# Patient Record
Sex: MALE | URBAN - METROPOLITAN AREA
[De-identification: names, ages, dates, MRNs, and addresses within clinical notes are randomized per-mention and may not be internally consistent; named-entity substitution may affect disease eponyms.]

---

## 2020-09-30 ENCOUNTER — TELEPHONE (OUTPATIENT)
Dept: GASTROENTEROLOGY | Age: 20
End: 2020-09-30

## 2020-11-05 ENCOUNTER — NURSE TRIAGE (OUTPATIENT)
Dept: INTERNAL MEDICINE | Age: 20
End: 2020-11-05

## 2020-11-06 ENCOUNTER — APPOINTMENT (OUTPATIENT)
Dept: INTERNAL MEDICINE | Age: 20
End: 2020-11-06

## 2020-11-11 ENCOUNTER — TELEPHONE (OUTPATIENT)
Dept: INTERNAL MEDICINE | Age: 20
End: 2020-11-11

## 2021-03-08 ENCOUNTER — HOSPITAL ENCOUNTER (EMERGENCY)
Facility: HOSPITAL | Age: 21
Discharge: HOME | End: 2021-03-08
Attending: EMERGENCY MEDICINE
Payer: COMMERCIAL

## 2021-03-08 ENCOUNTER — APPOINTMENT (OUTPATIENT)
Dept: GENERAL RADIOLOGY | Facility: HOSPITAL | Age: 21
End: 2021-03-08
Attending: EMERGENCY MEDICINE
Payer: COMMERCIAL

## 2021-03-08 VITALS
WEIGHT: 147 LBS | SYSTOLIC BLOOD PRESSURE: 145 MMHG | HEART RATE: 75 BPM | HEIGHT: 72 IN | RESPIRATION RATE: 16 BRPM | DIASTOLIC BLOOD PRESSURE: 86 MMHG | OXYGEN SATURATION: 100 % | BODY MASS INDEX: 19.91 KG/M2

## 2021-03-08 DIAGNOSIS — R11.15 CYCLICAL VOMITING: Primary | ICD-10-CM

## 2021-03-08 DIAGNOSIS — F12.90 CANNABINOID HYPEREMESIS SYNDROME: ICD-10-CM

## 2021-03-08 DIAGNOSIS — E86.0 DEHYDRATION: ICD-10-CM

## 2021-03-08 DIAGNOSIS — R11.2 CANNABINOID HYPEREMESIS SYNDROME: ICD-10-CM

## 2021-03-08 LAB
ALBUMIN SERPL-MCNC: 4.4 G/DL (ref 3.4–5)
ALBUMIN/GLOB SERPL: 1.1 {RATIO} (ref 1–2)
ALP LIVER SERPL-CCNC: 67 U/L
ALT SERPL-CCNC: 31 U/L
AMPHET UR QL SCN: NEGATIVE
ANION GAP SERPL CALC-SCNC: 8 MMOL/L (ref 0–18)
AST SERPL-CCNC: 15 U/L (ref 15–37)
BASOPHILS # BLD AUTO: 0.04 X10(3) UL (ref 0–0.2)
BASOPHILS NFR BLD AUTO: 0.4 %
BENZODIAZ UR QL SCN: NEGATIVE
BILIRUB SERPL-MCNC: 2.4 MG/DL (ref 0.1–2)
BILIRUB UR QL STRIP.AUTO: NEGATIVE
BUN BLD-MCNC: 17 MG/DL (ref 7–18)
BUN/CREAT SERPL: 19.1 (ref 10–20)
CALCIUM BLD-MCNC: 9.5 MG/DL (ref 8.5–10.1)
CHLORIDE SERPL-SCNC: 99 MMOL/L (ref 98–112)
CLARITY UR REFRACT.AUTO: CLEAR
CO2 SERPL-SCNC: 27 MMOL/L (ref 21–32)
COCAINE UR QL: NEGATIVE
COLOR UR AUTO: YELLOW
CREAT BLD-MCNC: 0.89 MG/DL
CREAT UR-SCNC: 254 MG/DL
DEPRECATED RDW RBC AUTO: 34.1 FL (ref 35.1–46.3)
EOSINOPHIL # BLD AUTO: 0.17 X10(3) UL (ref 0–0.7)
EOSINOPHIL NFR BLD AUTO: 1.9 %
ERYTHROCYTE [DISTWIDTH] IN BLOOD BY AUTOMATED COUNT: 11.6 % (ref 11–15)
GLOBULIN PLAS-MCNC: 3.9 G/DL (ref 2.8–4.4)
GLUCOSE BLD-MCNC: 89 MG/DL (ref 70–99)
GLUCOSE UR STRIP.AUTO-MCNC: NEGATIVE MG/DL
HCT VFR BLD AUTO: 49.6 %
HGB BLD-MCNC: 17.2 G/DL
IMM GRANULOCYTES # BLD AUTO: 0.04 X10(3) UL (ref 0–1)
IMM GRANULOCYTES NFR BLD: 0.4 %
KETONES UR STRIP.AUTO-MCNC: 20 MG/DL
LEUKOCYTE ESTERASE UR QL STRIP.AUTO: NEGATIVE
LIPASE SERPL-CCNC: 166 U/L (ref 73–393)
LYMPHOCYTES # BLD AUTO: 2.62 X10(3) UL (ref 1–4)
LYMPHOCYTES NFR BLD AUTO: 29 %
M PROTEIN MFR SERPL ELPH: 8.3 G/DL (ref 6.4–8.2)
MCH RBC QN AUTO: 28.4 PG (ref 26–34)
MCHC RBC AUTO-ENTMCNC: 34.7 G/DL (ref 31–37)
MCV RBC AUTO: 81.8 FL
MDMA UR QL SCN: NEGATIVE
MONOCYTES # BLD AUTO: 1.15 X10(3) UL (ref 0.1–1)
MONOCYTES NFR BLD AUTO: 12.7 %
NEUTROPHILS # BLD AUTO: 5 X10 (3) UL (ref 1.5–7.7)
NEUTROPHILS # BLD AUTO: 5 X10(3) UL (ref 1.5–7.7)
NEUTROPHILS NFR BLD AUTO: 55.6 %
NITRITE UR QL STRIP.AUTO: NEGATIVE
OPIATES UR QL SCN: NEGATIVE
OSMOLALITY SERPL CALC.SUM OF ELEC: 279 MOSM/KG (ref 275–295)
OXYCODONE UR QL SCN: NEGATIVE
PH UR STRIP.AUTO: 6 [PH] (ref 5–8)
PLATELET # BLD AUTO: 248 10(3)UL (ref 150–450)
POTASSIUM SERPL-SCNC: 3.6 MMOL/L (ref 3.5–5.1)
PROT UR STRIP.AUTO-MCNC: NEGATIVE MG/DL
RBC # BLD AUTO: 6.06 X10(6)UL
RBC UR QL AUTO: NEGATIVE
SODIUM SERPL-SCNC: 134 MMOL/L (ref 136–145)
SP GR UR STRIP.AUTO: 1.02 (ref 1–1.03)
UROBILINOGEN UR STRIP.AUTO-MCNC: 2 MG/DL
WBC # BLD AUTO: 9 X10(3) UL (ref 4–11)

## 2021-03-08 PROCEDURE — 99284 EMERGENCY DEPT VISIT MOD MDM: CPT

## 2021-03-08 PROCEDURE — 96375 TX/PRO/DX INJ NEW DRUG ADDON: CPT

## 2021-03-08 PROCEDURE — 80307 DRUG TEST PRSMV CHEM ANLYZR: CPT | Performed by: EMERGENCY MEDICINE

## 2021-03-08 PROCEDURE — 96374 THER/PROPH/DIAG INJ IV PUSH: CPT

## 2021-03-08 PROCEDURE — 71045 X-RAY EXAM CHEST 1 VIEW: CPT | Performed by: EMERGENCY MEDICINE

## 2021-03-08 PROCEDURE — 80053 COMPREHEN METABOLIC PANEL: CPT | Performed by: EMERGENCY MEDICINE

## 2021-03-08 PROCEDURE — 81003 URINALYSIS AUTO W/O SCOPE: CPT | Performed by: EMERGENCY MEDICINE

## 2021-03-08 PROCEDURE — 96361 HYDRATE IV INFUSION ADD-ON: CPT

## 2021-03-08 PROCEDURE — 74019 RADEX ABDOMEN 2 VIEWS: CPT | Performed by: EMERGENCY MEDICINE

## 2021-03-08 PROCEDURE — 85025 COMPLETE CBC W/AUTO DIFF WBC: CPT | Performed by: EMERGENCY MEDICINE

## 2021-03-08 PROCEDURE — 83690 ASSAY OF LIPASE: CPT | Performed by: EMERGENCY MEDICINE

## 2021-03-08 RX ORDER — METOCLOPRAMIDE HYDROCHLORIDE 5 MG/ML
10 INJECTION INTRAMUSCULAR; INTRAVENOUS ONCE
Status: COMPLETED | OUTPATIENT
Start: 2021-03-08 | End: 2021-03-08

## 2021-03-08 RX ORDER — DIPHENHYDRAMINE HYDROCHLORIDE 50 MG/ML
50 INJECTION INTRAMUSCULAR; INTRAVENOUS ONCE
Status: COMPLETED | OUTPATIENT
Start: 2021-03-08 | End: 2021-03-08

## 2021-03-08 RX ORDER — METOCLOPRAMIDE 10 MG/1
10 TABLET ORAL 3 TIMES DAILY PRN
Qty: 20 TABLET | Refills: 0 | Status: SHIPPED | OUTPATIENT
Start: 2021-03-08

## 2021-03-09 NOTE — ED PROVIDER NOTES
Patient Seen in: [facility] Emergency Department      History   Patient presents with:  Nausea/Vomiting/Diarrhea    Stated Complaint: nausea/vomiting    HPI/Subjective:   HPI    24-year-old male presents to the emergency department with complaints o and normal weight. Comments: Smells heavily of marijuana   HENT:      Head: Normocephalic and atraumatic. Cardiovascular:      Rate and Rhythm: Normal rate and regular rhythm. Heart sounds: Normal heart sounds.    Pulmonary:      Effort: Pulmona created for panel order CBC WITH DIFFERENTIAL WITH PLATELET.   Procedure                               Abnormality         Status                     ---------                               -----------         ------                     CBC W/ DIFFERENTIAL[ obtained. He was placed on a monitor. I reviewed care everywhere and patient has had numerous visits to numerous hospitals for similar complaints and has had work-ups and is consistent with cannabinoid hyperemesis syndrome.   Patient states he does have s

## 2021-08-03 ENCOUNTER — APPOINTMENT (OUTPATIENT)
Dept: GENERAL RADIOLOGY | Facility: HOSPITAL | Age: 21
End: 2021-08-03
Attending: PEDIATRICS
Payer: COMMERCIAL

## 2021-08-03 PROCEDURE — 74019 RADEX ABDOMEN 2 VIEWS: CPT | Performed by: PEDIATRICS

## 2021-08-03 NOTE — ED PROVIDER NOTES
Patient Seen in: [facility] Emergency Department      History   Patient presents with:  Vomiting    Stated Complaint: vomiting x3 days, smokes marijuana daily    HPI/Subjective:   HPI    26-year-old male with complaints of nausea and vomiting and ab Wt 68 kg   SpO2 98%   BMI 20.34 kg/m²         Physical Exam   PE: Awake, alert, NAD, nauseous  HEENT: PERRLA; TMS clear; OP clear  COR:  RRR  Chest: clear  Abdomen: soft, NT, no HSM  : normal  Neuro:  CN 2-12 grossly intact, gait normal; strength 5/5 UE Final result                 Please view results for these tests on the individual orders.    RAINBOW DRAW LAVENDER   RAINBOW DRAW LIGHT GREEN   RAINBOW DRAW GOLD          Medications   sodium chloride 0.9% IV bolus 1,000 mL (0 mL Intravenous Stopped 8/3/2 to 2 L of normal saline and patient refused and would like to go home. Discussed symptoms most likely due to cannabis abuse and to stop smoking and vaping marijuana.   Supportive care for cannabis hyperemesis syndrome, Zofran every 8 hours as needed for vo
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
## 2022-01-18 ENCOUNTER — APPOINTMENT (OUTPATIENT)
Dept: GENERAL RADIOLOGY | Facility: HOSPITAL | Age: 22
End: 2022-01-18
Attending: EMERGENCY MEDICINE
Payer: COMMERCIAL

## 2022-01-18 ENCOUNTER — HOSPITAL ENCOUNTER (EMERGENCY)
Facility: HOSPITAL | Age: 22
Discharge: HOME | End: 2022-01-18
Attending: EMERGENCY MEDICINE
Payer: COMMERCIAL

## 2022-01-18 VITALS
HEART RATE: 52 BPM | HEIGHT: 72 IN | RESPIRATION RATE: 16 BRPM | BODY MASS INDEX: 21.67 KG/M2 | TEMPERATURE: 98 F | SYSTOLIC BLOOD PRESSURE: 116 MMHG | DIASTOLIC BLOOD PRESSURE: 72 MMHG | WEIGHT: 160 LBS | OXYGEN SATURATION: 99 %

## 2022-01-18 DIAGNOSIS — S16.1XXA STRAIN OF NECK MUSCLE, INITIAL ENCOUNTER: ICD-10-CM

## 2022-01-18 DIAGNOSIS — S83.92XA SPRAIN OF LEFT KNEE, UNSPECIFIED LIGAMENT, INITIAL ENCOUNTER: ICD-10-CM

## 2022-01-18 DIAGNOSIS — S39.012A BACK STRAIN, INITIAL ENCOUNTER: Primary | ICD-10-CM

## 2022-01-18 PROCEDURE — 72070 X-RAY EXAM THORAC SPINE 2VWS: CPT | Performed by: EMERGENCY MEDICINE

## 2022-01-18 PROCEDURE — 73560 X-RAY EXAM OF KNEE 1 OR 2: CPT | Performed by: EMERGENCY MEDICINE

## 2022-01-18 PROCEDURE — 72110 X-RAY EXAM L-2 SPINE 4/>VWS: CPT | Performed by: EMERGENCY MEDICINE

## 2022-01-18 PROCEDURE — 72050 X-RAY EXAM NECK SPINE 4/5VWS: CPT | Performed by: EMERGENCY MEDICINE

## 2022-01-18 PROCEDURE — 99285 EMERGENCY DEPT VISIT HI MDM: CPT

## 2022-01-18 PROCEDURE — 99284 EMERGENCY DEPT VISIT MOD MDM: CPT

## 2022-01-18 RX ORDER — IBUPROFEN 400 MG/1
400 TABLET ORAL ONCE
Status: COMPLETED | OUTPATIENT
Start: 2022-01-18 | End: 2022-01-18
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
## 2022-11-26 ENCOUNTER — APPOINTMENT (OUTPATIENT)
Dept: CT IMAGING | Facility: HOSPITAL | Age: 22
DRG: 331 | End: 2022-11-26
Attending: EMERGENCY MEDICINE
Payer: COMMERCIAL

## 2022-11-26 ENCOUNTER — APPOINTMENT (OUTPATIENT)
Dept: CT IMAGING | Facility: HOSPITAL | Age: 22
End: 2022-11-26
Attending: EMERGENCY MEDICINE
Payer: COMMERCIAL

## 2022-11-26 ENCOUNTER — HOSPITAL ENCOUNTER (INPATIENT)
Facility: HOSPITAL | Age: 22
LOS: 2 days | Discharge: HOME | DRG: 331 | End: 2022-12-01
Attending: EMERGENCY MEDICINE | Admitting: INTERNAL MEDICINE
Payer: COMMERCIAL

## 2022-11-26 ENCOUNTER — HOSPITAL ENCOUNTER (INPATIENT)
Facility: HOSPITAL | Age: 22
LOS: 2 days | Discharge: HOME | End: 2022-12-01
Attending: EMERGENCY MEDICINE | Admitting: INTERNAL MEDICINE
Payer: COMMERCIAL

## 2022-11-26 DIAGNOSIS — K56.1 INTUSSUSCEPTION INTESTINE (HCC): Primary | ICD-10-CM

## 2022-11-26 DIAGNOSIS — K56.1 INTUSSUSCEPTION (HCC): ICD-10-CM

## 2022-11-26 LAB
ALBUMIN SERPL-MCNC: 4.2 G/DL (ref 3.4–5)
ALBUMIN/GLOB SERPL: 0.9 {RATIO} (ref 1–2)
ALP LIVER SERPL-CCNC: 61 U/L
ALT SERPL-CCNC: 30 U/L
ANION GAP SERPL CALC-SCNC: 8 MMOL/L (ref 0–18)
AST SERPL-CCNC: 32 U/L (ref 15–37)
BASOPHILS # BLD AUTO: 0.01 X10(3) UL (ref 0–0.2)
BASOPHILS NFR BLD AUTO: 0.2 %
BILIRUB SERPL-MCNC: 0.6 MG/DL (ref 0.1–2)
BILIRUB UR QL CFM: NEGATIVE
BUN BLD-MCNC: 16 MG/DL (ref 7–18)
CALCIUM BLD-MCNC: 9.3 MG/DL (ref 8.5–10.1)
CHLORIDE SERPL-SCNC: 102 MMOL/L (ref 98–112)
CLARITY UR REFRACT.AUTO: CLEAR
CO2 SERPL-SCNC: 27 MMOL/L (ref 21–32)
COLOR UR AUTO: YELLOW
CREAT BLD-MCNC: 1.18 MG/DL
EOSINOPHIL # BLD AUTO: 0 X10(3) UL (ref 0–0.7)
EOSINOPHIL NFR BLD AUTO: 0 %
ERYTHROCYTE [DISTWIDTH] IN BLOOD BY AUTOMATED COUNT: 12.5 %
GFR SERPLBLD BASED ON 1.73 SQ M-ARVRAT: 89 ML/MIN/1.73M2 (ref 60–?)
GLOBULIN PLAS-MCNC: 4.8 G/DL (ref 2.8–4.4)
GLUCOSE BLD-MCNC: 115 MG/DL (ref 70–99)
GLUCOSE UR STRIP.AUTO-MCNC: NEGATIVE MG/DL
HCT VFR BLD AUTO: 49.5 %
HGB BLD-MCNC: 16.7 G/DL
IMM GRANULOCYTES # BLD AUTO: 0.02 X10(3) UL (ref 0–1)
IMM GRANULOCYTES NFR BLD: 0.3 %
KETONES UR STRIP.AUTO-MCNC: 80 MG/DL
LEUKOCYTE ESTERASE UR QL STRIP.AUTO: NEGATIVE
LIPASE SERPL-CCNC: 181 U/L (ref 73–393)
LYMPHOCYTES # BLD AUTO: 0.71 X10(3) UL (ref 1–4)
LYMPHOCYTES NFR BLD AUTO: 11.4 %
MCH RBC QN AUTO: 28.1 PG (ref 26–34)
MCHC RBC AUTO-ENTMCNC: 33.7 G/DL (ref 31–37)
MCV RBC AUTO: 83.2 FL
MONOCYTES # BLD AUTO: 0.39 X10(3) UL (ref 0.1–1)
MONOCYTES NFR BLD AUTO: 6.3 %
NEUTROPHILS # BLD AUTO: 5.11 X10 (3) UL (ref 1.5–7.7)
NEUTROPHILS # BLD AUTO: 5.11 X10(3) UL (ref 1.5–7.7)
NEUTROPHILS NFR BLD AUTO: 81.8 %
NITRITE UR QL STRIP.AUTO: NEGATIVE
OSMOLALITY SERPL CALC.SUM OF ELEC: 286 MOSM/KG (ref 275–295)
PH UR STRIP.AUTO: 6 [PH] (ref 5–8)
PLATELET # BLD AUTO: 190 10(3)UL (ref 150–450)
POTASSIUM SERPL-SCNC: 4.3 MMOL/L (ref 3.5–5.1)
PROT SERPL-MCNC: 9 G/DL (ref 6.4–8.2)
RBC # BLD AUTO: 5.95 X10(6)UL
RBC UR QL AUTO: NEGATIVE
SARS-COV-2 RNA RESP QL NAA+PROBE: NOT DETECTED
SODIUM SERPL-SCNC: 137 MMOL/L (ref 136–145)
SP GR UR STRIP.AUTO: >=1.03 (ref 1–1.03)
UROBILINOGEN UR STRIP.AUTO-MCNC: 0.2 MG/DL
WBC # BLD AUTO: 6.2 X10(3) UL (ref 4–11)

## 2022-11-26 PROCEDURE — 99220 INITIAL OBSERVATION CARE,LEVL III: CPT | Performed by: INTERNAL MEDICINE

## 2022-11-26 PROCEDURE — 74177 CT ABD & PELVIS W/CONTRAST: CPT | Performed by: EMERGENCY MEDICINE

## 2022-11-26 RX ORDER — IBUPROFEN 200 MG
200 TABLET ORAL EVERY 6 HOURS PRN
COMMUNITY
End: 2022-12-01

## 2022-11-26 RX ORDER — SODIUM PHOSPHATE, DIBASIC AND SODIUM PHOSPHATE, MONOBASIC 7; 19 G/133ML; G/133ML
1 ENEMA RECTAL ONCE AS NEEDED
Status: DISCONTINUED | OUTPATIENT
Start: 2022-11-26 | End: 2022-11-28

## 2022-11-26 RX ORDER — POLYETHYLENE GLYCOL 3350 17 G/17G
17 POWDER, FOR SOLUTION ORAL DAILY PRN
Status: DISCONTINUED | OUTPATIENT
Start: 2022-11-26 | End: 2022-11-28

## 2022-11-26 RX ORDER — KETOROLAC TROMETHAMINE 15 MG/ML
15 INJECTION, SOLUTION INTRAMUSCULAR; INTRAVENOUS ONCE
Status: COMPLETED | OUTPATIENT
Start: 2022-11-26 | End: 2022-11-26

## 2022-11-26 RX ORDER — ONDANSETRON 2 MG/ML
4 INJECTION INTRAMUSCULAR; INTRAVENOUS EVERY 6 HOURS PRN
Status: DISCONTINUED | OUTPATIENT
Start: 2022-11-26 | End: 2022-11-28

## 2022-11-26 RX ORDER — BISACODYL 10 MG
10 SUPPOSITORY, RECTAL RECTAL
Status: DISCONTINUED | OUTPATIENT
Start: 2022-11-26 | End: 2022-11-28

## 2022-11-26 RX ORDER — ONDANSETRON 2 MG/ML
4 INJECTION INTRAMUSCULAR; INTRAVENOUS ONCE
Status: COMPLETED | OUTPATIENT
Start: 2022-11-26 | End: 2022-11-26

## 2022-11-26 RX ORDER — SODIUM CHLORIDE, SODIUM LACTATE, POTASSIUM CHLORIDE, CALCIUM CHLORIDE 600; 310; 30; 20 MG/100ML; MG/100ML; MG/100ML; MG/100ML
INJECTION, SOLUTION INTRAVENOUS CONTINUOUS
Status: DISCONTINUED | OUTPATIENT
Start: 2022-11-26 | End: 2022-11-27

## 2022-11-26 RX ORDER — SENNOSIDES 8.6 MG
17.2 TABLET ORAL NIGHTLY PRN
Status: DISCONTINUED | OUTPATIENT
Start: 2022-11-26 | End: 2022-11-28

## 2022-11-26 RX ORDER — PROCHLORPERAZINE EDISYLATE 5 MG/ML
5 INJECTION INTRAMUSCULAR; INTRAVENOUS EVERY 8 HOURS PRN
Status: DISCONTINUED | OUTPATIENT
Start: 2022-11-26 | End: 2022-11-28

## 2022-11-26 RX ORDER — ACETAMINOPHEN 500 MG
500 TABLET ORAL EVERY 4 HOURS PRN
Status: DISCONTINUED | OUTPATIENT
Start: 2022-11-26 | End: 2022-11-28

## 2022-11-26 RX ORDER — MELATONIN
3 NIGHTLY PRN
Status: DISCONTINUED | OUTPATIENT
Start: 2022-11-26 | End: 2022-11-28

## 2022-11-26 RX ORDER — SODIUM CHLORIDE 9 MG/ML
INJECTION, SOLUTION INTRAVENOUS CONTINUOUS
Status: ACTIVE | OUTPATIENT
Start: 2022-11-26 | End: 2022-11-26

## 2022-11-26 RX ORDER — ENOXAPARIN SODIUM 100 MG/ML
40 INJECTION SUBCUTANEOUS DAILY
Status: DISCONTINUED | OUTPATIENT
Start: 2022-11-27 | End: 2022-11-28

## 2022-11-26 NOTE — ED INITIAL ASSESSMENT (HPI)
PT states that he has been having fever for the last two days, nausea, vomit, cold sweats, low energy. PT has been taking OTC medications at home.

## 2022-11-27 LAB
AMPHET UR QL SCN: NEGATIVE
ANION GAP SERPL CALC-SCNC: 7 MMOL/L (ref 0–18)
BARBITURATES UR QL SCN: NEGATIVE
BASOPHILS # BLD AUTO: 0.01 X10(3) UL (ref 0–0.2)
BASOPHILS NFR BLD AUTO: 0.2 %
BENZODIAZ UR QL SCN: NEGATIVE
BUN BLD-MCNC: 16 MG/DL (ref 7–18)
CALCIUM BLD-MCNC: 8.7 MG/DL (ref 8.5–10.1)
CHLORIDE SERPL-SCNC: 109 MMOL/L (ref 98–112)
CO2 SERPL-SCNC: 28 MMOL/L (ref 21–32)
COCAINE UR QL: NEGATIVE
CREAT BLD-MCNC: 1.03 MG/DL
CREAT UR-SCNC: 166 MG/DL
CREAT UR-SCNC: 166 MG/DL
EOSINOPHIL # BLD AUTO: 0 X10(3) UL (ref 0–0.7)
EOSINOPHIL NFR BLD AUTO: 0 %
ERYTHROCYTE [DISTWIDTH] IN BLOOD BY AUTOMATED COUNT: 12.7 %
GFR SERPLBLD BASED ON 1.73 SQ M-ARVRAT: 105 ML/MIN/1.73M2 (ref 60–?)
GLUCOSE BLD-MCNC: 97 MG/DL (ref 70–99)
HCT VFR BLD AUTO: 46.7 %
HGB BLD-MCNC: 15.6 G/DL
IMM GRANULOCYTES # BLD AUTO: 0.02 X10(3) UL (ref 0–1)
IMM GRANULOCYTES NFR BLD: 0.3 %
LYMPHOCYTES # BLD AUTO: 1.05 X10(3) UL (ref 1–4)
LYMPHOCYTES NFR BLD AUTO: 16.1 %
MCH RBC QN AUTO: 28.2 PG (ref 26–34)
MCHC RBC AUTO-ENTMCNC: 33.4 G/DL (ref 31–37)
MCV RBC AUTO: 84.4 FL
MDMA UR QL SCN: NEGATIVE
METHADONE UR QL SCN: NEGATIVE
MONOCYTES # BLD AUTO: 0.69 X10(3) UL (ref 0.1–1)
MONOCYTES NFR BLD AUTO: 10.6 %
NEUTROPHILS # BLD AUTO: 4.75 X10 (3) UL (ref 1.5–7.7)
NEUTROPHILS # BLD AUTO: 4.75 X10(3) UL (ref 1.5–7.7)
NEUTROPHILS NFR BLD AUTO: 72.8 %
OPIATES UR QL SCN: NEGATIVE
OSMOLALITY SERPL CALC.SUM OF ELEC: 299 MOSM/KG (ref 275–295)
OXYCODONE UR QL SCN: NEGATIVE
PCP UR QL SCN: NEGATIVE
PLATELET # BLD AUTO: 177 10(3)UL (ref 150–450)
POTASSIUM SERPL-SCNC: 3.9 MMOL/L (ref 3.5–5.1)
PROT UR-MCNC: 32.4 MG/DL
PROT/CREAT UR-RTO: 0.2
RBC # BLD AUTO: 5.53 X10(6)UL
SODIUM SERPL-SCNC: 144 MMOL/L (ref 136–145)
WBC # BLD AUTO: 6.5 X10(3) UL (ref 4–11)

## 2022-11-27 PROCEDURE — 99225 SUBSEQUENT OBSERVATION CARE: CPT | Performed by: INTERNAL MEDICINE

## 2022-11-27 NOTE — PLAN OF CARE
A&Ox4. RA. Educated pt on SCDs & Lovenox, pt refused. Tolerating clears. NPO @ midnight for CTEnterography in AM. Active bowel sounds. Abdomen soft, flat, tender. Passing flatus. Educated pt on the need for stool samples, informed pt of hat in the toilet. Pt denies having a BM. Voids. Denies SOB, CP, lightheadedness, N/V and pain. Up ad tana. IVF infusing. GI & hospitalist updated. POC discussed w/ pt and family/friends at bedside. All questions answered and concerns addressed. Safety precautions in place. Frequent rounds performed.      Problem: Patient/Family Goals  Goal: Patient/Family Long Term Goal  Description: Patient's Long Term Goal: Discharge  Interventions:  - IVF  -Comfort  -vss  - See additional Care Plan goals for specific interventions  Outcome: Progressing  Goal: Patient/Family Short Term Goal  Description: Patient's Short Term Goal: Comfort    Interventions:   - Rounding  - See additional Care Plan goals for specific interventions  Outcome: Progressing     Problem: PAIN - ADULT  Goal: Verbalizes/displays adequate comfort level or patient's stated pain goal  Description: INTERVENTIONS:  - Encourage pt to monitor pain and request assistance  - Assess pain using appropriate pain scale  - Administer analgesics based on type and severity of pain and evaluate response  - Implement non-pharmacological measures as appropriate and evaluate response  - Consider cultural and social influences on pain and pain management  - Manage/alleviate anxiety  - Utilize distraction and/or relaxation techniques  - Monitor for opioid side effects  - Notify MD/LIP if interventions unsuccessful or patient reports new pain  - Anticipate increased pain with activity and pre-medicate as appropriate  Outcome: Progressing     Problem: DISCHARGE PLANNING  Goal: Discharge to home or other facility with appropriate resources  Description: INTERVENTIONS:  - Identify barriers to discharge w/pt and caregiver  - Include patient/family/discharge partner in discharge planning  - Arrange for needed discharge resources and transportation as appropriate  - Identify discharge learning needs (meds, wound care, etc)  - Arrange for interpreters to assist at discharge as needed  - Consider post-discharge preferences of patient/family/discharge partner  - Complete POLST form as appropriate  - Assess patient's ability to be responsible for managing their own health  - Refer to Case Management Department for coordinating discharge planning if the patient needs post-hospital services based on physician/LIP order or complex needs related to functional status, cognitive ability or social support system  Outcome: Progressing

## 2022-11-27 NOTE — ED QUICK NOTES
Orders for admission, patient is aware of plan and ready to go upstairs. Any questions, please call ED RN Brisa Gallegos at extension [phone].      Patient Covid vaccination status: Unvaccinated     COVID Test Ordered in ED: Rapid SARS-CoV-2 by PCR    COVID Suspicion at Admission: Low clinical suspicion for COVID    Running Infusions:  None    Mental Status/LOC at time of transport: a/ox4    Other pertinent information:   CIWA score: N/A   NIH score:  N/A

## 2022-11-27 NOTE — PLAN OF CARE
Problem: Patient/Family Goals  Goal: Patient/Family Long Term Goal  Description: Patient's Long Term Goal: Discharge  Interventions:  - IVF  -Comfort  -vss  - See additional Care Plan goals for specific interventions  Outcome: Progressing  Goal: Patient/Family Short Term Goal  Description: Patient's Short Term Goal: Comfort    Interventions:   - Rounding  - See additional Care Plan goals for specific interventions  Outcome: Progressing     Problem: PAIN - ADULT  Goal: Verbalizes/displays adequate comfort level or patient's stated pain goal  Description: INTERVENTIONS:  - Encourage pt to monitor pain and request assistance  - Assess pain using appropriate pain scale  - Administer analgesics based on type and severity of pain and evaluate response  - Implement non-pharmacological measures as appropriate and evaluate response  - Consider cultural and social influences on pain and pain management  - Manage/alleviate anxiety  - Utilize distraction and/or relaxation techniques  - Monitor for opioid side effects  - Notify MD/LIP if interventions unsuccessful or patient reports new pain  - Anticipate increased pain with activity and pre-medicate as appropriate  Outcome: Progressing     Problem: DISCHARGE PLANNING  Goal: Discharge to home or other facility with appropriate resources  Description: INTERVENTIONS:  - Identify barriers to discharge w/pt and caregiver  - Include patient/family/discharge partner in discharge planning  - Arrange for needed discharge resources and transportation as appropriate  - Identify discharge learning needs (meds, wound care, etc)  - Arrange for interpreters to assist at discharge as needed  - Consider post-discharge preferences of patient/family/discharge partner  - Complete POLST form as appropriate  - Assess patient's ability to be responsible for managing their own health  - Refer to Case Management Department for coordinating discharge planning if the patient needs post-hospital services based on physician/LIP order or complex needs related to functional status, cognitive ability or social support system  Outcome: Progressing

## 2022-11-27 NOTE — PROGRESS NOTES
NURSING ADMISSION NOTE      Patient admitted via Cart  Oriented to room. Safety precautions initiated. Bed in low position. Call light in reach. Pt is alert and oriented  RA,VSS  Lung sounds clear  Started on IVF,NPO  On contact plus iso to r/o c-diff,and aslo pending gi panel  Pt denies nausea,no pain at this moment. Abdomen is soft,active.   Pt is up ad tana  POC discussed,call light in reach

## 2022-11-28 ENCOUNTER — ANESTHESIA EVENT (OUTPATIENT)
Dept: SURGERY | Facility: HOSPITAL | Age: 22
End: 2022-11-28
Payer: COMMERCIAL

## 2022-11-28 ENCOUNTER — APPOINTMENT (OUTPATIENT)
Dept: CT IMAGING | Facility: HOSPITAL | Age: 22
End: 2022-11-28
Attending: INTERNAL MEDICINE
Payer: COMMERCIAL

## 2022-11-28 ENCOUNTER — ANESTHESIA (OUTPATIENT)
Dept: SURGERY | Facility: HOSPITAL | Age: 22
End: 2022-11-28
Payer: COMMERCIAL

## 2022-11-28 ENCOUNTER — APPOINTMENT (OUTPATIENT)
Dept: CT IMAGING | Facility: HOSPITAL | Age: 22
DRG: 331 | End: 2022-11-28
Attending: INTERNAL MEDICINE
Payer: COMMERCIAL

## 2022-11-28 LAB
ALBUMIN SERPL-MCNC: 3.6 G/DL (ref 3.4–5)
ALBUMIN/GLOB SERPL: 0.9 {RATIO} (ref 1–2)
ALP LIVER SERPL-CCNC: 58 U/L
ALT SERPL-CCNC: 93 U/L
ANION GAP SERPL CALC-SCNC: 8 MMOL/L (ref 0–18)
AST SERPL-CCNC: 57 U/L (ref 15–37)
BASOPHILS # BLD AUTO: 0.03 X10(3) UL (ref 0–0.2)
BASOPHILS NFR BLD AUTO: 0.6 %
BILIRUB SERPL-MCNC: 1.1 MG/DL (ref 0.1–2)
BUN BLD-MCNC: 11 MG/DL (ref 7–18)
CALCIUM BLD-MCNC: 8.9 MG/DL (ref 8.5–10.1)
CHLORIDE SERPL-SCNC: 103 MMOL/L (ref 98–112)
CO2 SERPL-SCNC: 27 MMOL/L (ref 21–32)
CREAT BLD-MCNC: 0.91 MG/DL
EOSINOPHIL # BLD AUTO: 0 X10(3) UL (ref 0–0.7)
EOSINOPHIL NFR BLD AUTO: 0 %
ERYTHROCYTE [DISTWIDTH] IN BLOOD BY AUTOMATED COUNT: 12.4 %
GFR SERPLBLD BASED ON 1.73 SQ M-ARVRAT: 122 ML/MIN/1.73M2 (ref 60–?)
GLOBULIN PLAS-MCNC: 4.1 G/DL (ref 2.8–4.4)
GLUCOSE BLD-MCNC: 100 MG/DL (ref 70–99)
HCT VFR BLD AUTO: 47.1 %
HGB BLD-MCNC: 15.9 G/DL
IMM GRANULOCYTES # BLD AUTO: 0.02 X10(3) UL (ref 0–1)
IMM GRANULOCYTES NFR BLD: 0.4 %
LYMPHOCYTES # BLD AUTO: 1.18 X10(3) UL (ref 1–4)
LYMPHOCYTES NFR BLD AUTO: 22.8 %
MCH RBC QN AUTO: 28.1 PG (ref 26–34)
MCHC RBC AUTO-ENTMCNC: 33.8 G/DL (ref 31–37)
MCV RBC AUTO: 83.2 FL
MONOCYTES # BLD AUTO: 0.72 X10(3) UL (ref 0.1–1)
MONOCYTES NFR BLD AUTO: 13.9 %
NEUTROPHILS # BLD AUTO: 3.22 X10 (3) UL (ref 1.5–7.7)
NEUTROPHILS # BLD AUTO: 3.22 X10(3) UL (ref 1.5–7.7)
NEUTROPHILS NFR BLD AUTO: 62.3 %
OSMOLALITY SERPL CALC.SUM OF ELEC: 285 MOSM/KG (ref 275–295)
PLATELET # BLD AUTO: 171 10(3)UL (ref 150–450)
POTASSIUM SERPL-SCNC: 3.7 MMOL/L (ref 3.5–5.1)
PROT SERPL-MCNC: 7.7 G/DL (ref 6.4–8.2)
RBC # BLD AUTO: 5.66 X10(6)UL
SODIUM SERPL-SCNC: 138 MMOL/L (ref 136–145)
WBC # BLD AUTO: 5.2 X10(3) UL (ref 4–11)

## 2022-11-28 PROCEDURE — 3008F BODY MASS INDEX DOCD: CPT | Performed by: INTERNAL MEDICINE

## 2022-11-28 PROCEDURE — 3079F DIAST BP 80-89 MM HG: CPT | Performed by: INTERNAL MEDICINE

## 2022-11-28 PROCEDURE — 99244 OFF/OP CNSLTJ NEW/EST MOD 40: CPT | Performed by: SURGERY

## 2022-11-28 PROCEDURE — 99225 SUBSEQUENT OBSERVATION CARE: CPT | Performed by: INTERNAL MEDICINE

## 2022-11-28 PROCEDURE — 74177 CT ABD & PELVIS W/CONTRAST: CPT | Performed by: INTERNAL MEDICINE

## 2022-11-28 PROCEDURE — 3075F SYST BP GE 130 - 139MM HG: CPT | Performed by: INTERNAL MEDICINE

## 2022-11-28 RX ORDER — ACETAMINOPHEN 10 MG/ML
1000 INJECTION, SOLUTION INTRAVENOUS EVERY 6 HOURS PRN
Status: DISCONTINUED | OUTPATIENT
Start: 2022-11-28 | End: 2022-12-01

## 2022-11-28 RX ORDER — HYDROMORPHONE HYDROCHLORIDE 1 MG/ML
0.6 INJECTION, SOLUTION INTRAMUSCULAR; INTRAVENOUS; SUBCUTANEOUS EVERY 5 MIN PRN
Status: DISCONTINUED | OUTPATIENT
Start: 2022-11-28 | End: 2022-11-28 | Stop reason: HOSPADM

## 2022-11-28 RX ORDER — ENOXAPARIN SODIUM 100 MG/ML
40 INJECTION SUBCUTANEOUS DAILY
Status: DISCONTINUED | OUTPATIENT
Start: 2022-11-29 | End: 2022-12-01

## 2022-11-28 RX ORDER — HYDROMORPHONE HYDROCHLORIDE 1 MG/ML
0.4 INJECTION, SOLUTION INTRAMUSCULAR; INTRAVENOUS; SUBCUTANEOUS EVERY 5 MIN PRN
Status: DISCONTINUED | OUTPATIENT
Start: 2022-11-28 | End: 2022-11-28 | Stop reason: HOSPADM

## 2022-11-28 RX ORDER — HYDROMORPHONE HYDROCHLORIDE 1 MG/ML
0.1 INJECTION, SOLUTION INTRAMUSCULAR; INTRAVENOUS; SUBCUTANEOUS EVERY 2 HOUR PRN
Status: DISCONTINUED | OUTPATIENT
Start: 2022-11-28 | End: 2022-12-01

## 2022-11-28 RX ORDER — LIDOCAINE HYDROCHLORIDE 10 MG/ML
INJECTION, SOLUTION EPIDURAL; INFILTRATION; INTRACAUDAL; PERINEURAL AS NEEDED
Status: DISCONTINUED | OUTPATIENT
Start: 2022-11-28 | End: 2022-11-28 | Stop reason: SURG

## 2022-11-28 RX ORDER — HYDROMORPHONE HYDROCHLORIDE 1 MG/ML
0.2 INJECTION, SOLUTION INTRAMUSCULAR; INTRAVENOUS; SUBCUTANEOUS EVERY 5 MIN PRN
Status: DISCONTINUED | OUTPATIENT
Start: 2022-11-28 | End: 2022-11-28 | Stop reason: HOSPADM

## 2022-11-28 RX ORDER — HYDROMORPHONE HYDROCHLORIDE 1 MG/ML
0.2 INJECTION, SOLUTION INTRAMUSCULAR; INTRAVENOUS; SUBCUTANEOUS EVERY 2 HOUR PRN
Status: DISCONTINUED | OUTPATIENT
Start: 2022-11-28 | End: 2022-12-01

## 2022-11-28 RX ORDER — ROCURONIUM BROMIDE 10 MG/ML
INJECTION, SOLUTION INTRAVENOUS AS NEEDED
Status: DISCONTINUED | OUTPATIENT
Start: 2022-11-28 | End: 2022-11-28 | Stop reason: SURG

## 2022-11-28 RX ORDER — ONDANSETRON 2 MG/ML
INJECTION INTRAMUSCULAR; INTRAVENOUS AS NEEDED
Status: DISCONTINUED | OUTPATIENT
Start: 2022-11-28 | End: 2022-11-28 | Stop reason: SURG

## 2022-11-28 RX ORDER — HYDROMORPHONE HYDROCHLORIDE 1 MG/ML
0.4 INJECTION, SOLUTION INTRAMUSCULAR; INTRAVENOUS; SUBCUTANEOUS EVERY 2 HOUR PRN
Status: DISCONTINUED | OUTPATIENT
Start: 2022-11-28 | End: 2022-12-01

## 2022-11-28 RX ORDER — DEXAMETHASONE SODIUM PHOSPHATE 4 MG/ML
VIAL (ML) INJECTION AS NEEDED
Status: DISCONTINUED | OUTPATIENT
Start: 2022-11-28 | End: 2022-11-28 | Stop reason: SURG

## 2022-11-28 RX ORDER — SODIUM CHLORIDE, SODIUM LACTATE, POTASSIUM CHLORIDE, CALCIUM CHLORIDE 600; 310; 30; 20 MG/100ML; MG/100ML; MG/100ML; MG/100ML
INJECTION, SOLUTION INTRAVENOUS CONTINUOUS
Status: DISCONTINUED | OUTPATIENT
Start: 2022-11-29 | End: 2022-12-01

## 2022-11-28 RX ORDER — IOHEXOL 350 MG/ML
100 INJECTION, SOLUTION INTRAVENOUS
Status: COMPLETED | OUTPATIENT
Start: 2022-11-28 | End: 2022-11-28

## 2022-11-28 RX ORDER — SODIUM CHLORIDE, SODIUM LACTATE, POTASSIUM CHLORIDE, CALCIUM CHLORIDE 600; 310; 30; 20 MG/100ML; MG/100ML; MG/100ML; MG/100ML
INJECTION, SOLUTION INTRAVENOUS CONTINUOUS
Status: DISCONTINUED | OUTPATIENT
Start: 2022-11-28 | End: 2022-11-28 | Stop reason: HOSPADM

## 2022-11-28 RX ORDER — KETOROLAC TROMETHAMINE 15 MG/ML
15 INJECTION, SOLUTION INTRAMUSCULAR; INTRAVENOUS EVERY 6 HOURS PRN
Status: ACTIVE | OUTPATIENT
Start: 2022-11-28 | End: 2022-11-30

## 2022-11-28 RX ORDER — KETOROLAC TROMETHAMINE 30 MG/ML
INJECTION, SOLUTION INTRAMUSCULAR; INTRAVENOUS AS NEEDED
Status: DISCONTINUED | OUTPATIENT
Start: 2022-11-28 | End: 2022-11-28 | Stop reason: SURG

## 2022-11-28 RX ORDER — BUPIVACAINE HYDROCHLORIDE AND EPINEPHRINE 5; 5 MG/ML; UG/ML
INJECTION, SOLUTION EPIDURAL; INTRACAUDAL; PERINEURAL AS NEEDED
Status: DISCONTINUED | OUTPATIENT
Start: 2022-11-28 | End: 2022-11-28 | Stop reason: HOSPADM

## 2022-11-28 RX ORDER — CEFOXITIN 2 G/1
INJECTION, POWDER, FOR SOLUTION INTRAVENOUS AS NEEDED
Status: DISCONTINUED | OUTPATIENT
Start: 2022-11-28 | End: 2022-11-28 | Stop reason: SURG

## 2022-11-28 RX ORDER — NEOSTIGMINE METHYLSULFATE 1 MG/ML
INJECTION, SOLUTION INTRAVENOUS AS NEEDED
Status: DISCONTINUED | OUTPATIENT
Start: 2022-11-28 | End: 2022-11-28 | Stop reason: SURG

## 2022-11-28 RX ORDER — KETOROLAC TROMETHAMINE 30 MG/ML
30 INJECTION, SOLUTION INTRAMUSCULAR; INTRAVENOUS EVERY 6 HOURS PRN
Status: ACTIVE | OUTPATIENT
Start: 2022-11-28 | End: 2022-11-30

## 2022-11-28 RX ORDER — GLYCOPYRROLATE 0.2 MG/ML
INJECTION, SOLUTION INTRAMUSCULAR; INTRAVENOUS AS NEEDED
Status: DISCONTINUED | OUTPATIENT
Start: 2022-11-28 | End: 2022-11-28 | Stop reason: SURG

## 2022-11-28 RX ORDER — PROCHLORPERAZINE EDISYLATE 5 MG/ML
5 INJECTION INTRAMUSCULAR; INTRAVENOUS EVERY 8 HOURS PRN
Status: DISCONTINUED | OUTPATIENT
Start: 2022-11-28 | End: 2022-11-28 | Stop reason: HOSPADM

## 2022-11-28 RX ORDER — ACETAMINOPHEN 10 MG/ML
INJECTION, SOLUTION INTRAVENOUS
Status: COMPLETED
Start: 2022-11-28 | End: 2022-11-28

## 2022-11-28 RX ORDER — HYDROMORPHONE HYDROCHLORIDE 1 MG/ML
INJECTION, SOLUTION INTRAMUSCULAR; INTRAVENOUS; SUBCUTANEOUS
Status: COMPLETED
Start: 2022-11-28 | End: 2022-11-28

## 2022-11-28 RX ORDER — ONDANSETRON 2 MG/ML
4 INJECTION INTRAMUSCULAR; INTRAVENOUS EVERY 6 HOURS PRN
Status: DISCONTINUED | OUTPATIENT
Start: 2022-11-28 | End: 2022-11-28 | Stop reason: HOSPADM

## 2022-11-28 RX ORDER — NALOXONE HYDROCHLORIDE 0.4 MG/ML
80 INJECTION, SOLUTION INTRAMUSCULAR; INTRAVENOUS; SUBCUTANEOUS AS NEEDED
Status: DISCONTINUED | OUTPATIENT
Start: 2022-11-28 | End: 2022-11-28 | Stop reason: HOSPADM

## 2022-11-28 RX ORDER — PANTOPRAZOLE SODIUM 40 MG/1
40 TABLET, DELAYED RELEASE ORAL
Status: DISCONTINUED | OUTPATIENT
Start: 2022-11-29 | End: 2022-12-01

## 2022-11-28 RX ADMIN — SODIUM CHLORIDE, SODIUM LACTATE, POTASSIUM CHLORIDE, CALCIUM CHLORIDE: 600; 310; 30; 20 INJECTION, SOLUTION INTRAVENOUS at 20:15:00

## 2022-11-28 RX ADMIN — SODIUM CHLORIDE, SODIUM LACTATE, POTASSIUM CHLORIDE, CALCIUM CHLORIDE: 600; 310; 30; 20 INJECTION, SOLUTION INTRAVENOUS at 21:01:00

## 2022-11-28 RX ADMIN — ROCURONIUM BROMIDE 10 MG: 10 INJECTION, SOLUTION INTRAVENOUS at 20:00:00

## 2022-11-28 RX ADMIN — LIDOCAINE HYDROCHLORIDE 100 MG: 10 INJECTION, SOLUTION EPIDURAL; INFILTRATION; INTRACAUDAL; PERINEURAL at 19:26:00

## 2022-11-28 RX ADMIN — KETOROLAC TROMETHAMINE 30 MG: 30 INJECTION, SOLUTION INTRAMUSCULAR; INTRAVENOUS at 20:40:00

## 2022-11-28 RX ADMIN — DEXAMETHASONE SODIUM PHOSPHATE 4 MG: 4 MG/ML VIAL (ML) INJECTION at 19:38:00

## 2022-11-28 RX ADMIN — NEOSTIGMINE METHYLSULFATE 4 MG: 1 INJECTION, SOLUTION INTRAVENOUS at 20:53:00

## 2022-11-28 RX ADMIN — CEFOXITIN 2 G: 2 INJECTION, POWDER, FOR SOLUTION INTRAVENOUS at 19:38:00

## 2022-11-28 RX ADMIN — ROCURONIUM BROMIDE 40 MG: 10 INJECTION, SOLUTION INTRAVENOUS at 19:29:00

## 2022-11-28 RX ADMIN — GLYCOPYRROLATE 0.6 MG: 0.2 INJECTION, SOLUTION INTRAMUSCULAR; INTRAVENOUS at 20:53:00

## 2022-11-28 RX ADMIN — ONDANSETRON 4 MG: 2 INJECTION INTRAMUSCULAR; INTRAVENOUS at 19:38:00

## 2022-11-28 RX ADMIN — SODIUM CHLORIDE, SODIUM LACTATE, POTASSIUM CHLORIDE, CALCIUM CHLORIDE: 600; 310; 30; 20 INJECTION, SOLUTION INTRAVENOUS at 19:21:00

## 2022-11-28 NOTE — PLAN OF CARE
A&OX4,RA VSS,up ad tana  Denies sob,LS clear  Denies n/v;no bm reported today,abdomen is soft and BS active  Report very mild stomach discomfort,tolerable  IVF infusing,tolerating clears right now,plan for npo at [place].pt updated    Problem: Patient/Family Goals  Goal: Patient/Family Long Term Goal  Description: Patient's Long Term Goal: Discharge  Interventions:  - IVF  -Comfort  -vss  - See additional Care Plan goals for specific interventions  Outcome: Progressing  Goal: Patient/Family Short Term Goal  Description: Patient's Short Term Goal: Comfort    Interventions:   - Rounding  - See additional Care Plan goals for specific interventions  Outcome: Progressing     Problem: PAIN - ADULT  Goal: Verbalizes/displays adequate comfort level or patient's stated pain goal  Description: INTERVENTIONS:  - Encourage pt to monitor pain and request assistance  - Assess pain using appropriate pain scale  - Administer analgesics based on type and severity of pain and evaluate response  - Implement non-pharmacological measures as appropriate and evaluate response  - Consider cultural and social influences on pain and pain management  - Manage/alleviate anxiety  - Utilize distraction and/or relaxation techniques  - Monitor for opioid side effects  - Notify MD/LIP if interventions unsuccessful or patient reports new pain  - Anticipate increased pain with activity and pre-medicate as appropriate  Outcome: Progressing     Problem: DISCHARGE PLANNING  Goal: Discharge to home or other facility with appropriate resources  Description: INTERVENTIONS:  - Identify barriers to discharge w/pt and caregiver  - Include patient/family/discharge partner in discharge planning  - Arrange for needed discharge resources and transportation as appropriate  - Identify discharge learning needs (meds, wound care, etc)  - Arrange for interpreters to assist at discharge as needed  - Consider post-discharge preferences of patient/family/discharge partner  - Complete POLST form as appropriate  - Assess patient's ability to be responsible for managing their own health  - Refer to Case Management Department for coordinating discharge planning if the patient needs post-hospital services based on physician/LIP order or complex needs related to functional status, cognitive ability or social support system  Outcome: Progressing

## 2022-11-28 NOTE — PROGRESS NOTES
The patient was seen and evaluated by general surgery. Full consult note to follow. 25year old male presents with 3 day history of abdominal pain, nausea, and vomiting. The patient reports subjective fevers at home up to 103. Imaging suggests jejunal intussusception. 1. Plan for diagnostic laparoscopy with possible bowel resection, possible exploratory laparotomy with Dr. Juve Swartz this evening  2. Strict NPO  3. Antibiotics per SCIP  4. All risks, benefits, complications and alternatives to the proposed operation were fully discussed with the patient. All questions from the patient were answered in detail. A description of the procedure and its possible outcomes was fully discussed. The patient seemed to understand the conversation and its details. Consent for surgery was confirmed with the patient.       Mushtaq Duff PA-C  11/28/2022  1:17 PM

## 2022-11-28 NOTE — PLAN OF CARE
A&Ox4. RA. SCDs & Lovenox refused. NPO. Active bowel sounds. Abdomen soft and tender. Passing flatus. BM+. Voids. C/o mild abdominal discomfort, declines pharmacological intervention. Up ad tana. IVF infusing. Sx consulted, consent on chart. POC discussed w/ pt and family, all questions answered and concerns addressed. Safety precautions in place. Frequent rounds performed.      Problem: Patient/Family Goals  Goal: Patient/Family Long Term Goal  Description: Patient's Long Term Goal: Discharge  Interventions:  - IVF  -Comfort  -vss  - See additional Care Plan goals for specific interventions  Outcome: Progressing  Goal: Patient/Family Short Term Goal  Description: Patient's Short Term Goal: Comfort    Interventions:   - Rounding  - See additional Care Plan goals for specific interventions  Outcome: Progressing     Problem: PAIN - ADULT  Goal: Verbalizes/displays adequate comfort level or patient's stated pain goal  Description: INTERVENTIONS:  - Encourage pt to monitor pain and request assistance  - Assess pain using appropriate pain scale  - Administer analgesics based on type and severity of pain and evaluate response  - Implement non-pharmacological measures as appropriate and evaluate response  - Consider cultural and social influences on pain and pain management  - Manage/alleviate anxiety  - Utilize distraction and/or relaxation techniques  - Monitor for opioid side effects  - Notify MD/LIP if interventions unsuccessful or patient reports new pain  - Anticipate increased pain with activity and pre-medicate as appropriate  Outcome: Progressing     Problem: DISCHARGE PLANNING  Goal: Discharge to home or other facility with appropriate resources  Description: INTERVENTIONS:  - Identify barriers to discharge w/pt and caregiver  - Include patient/family/discharge partner in discharge planning  - Arrange for needed discharge resources and transportation as appropriate  - Identify discharge learning needs (meds, wound care, etc)  - Arrange for interpreters to assist at discharge as needed  - Consider post-discharge preferences of patient/family/discharge partner  - Complete POLST form as appropriate  - Assess patient's ability to be responsible for managing their own health  - Refer to Case Management Department for coordinating discharge planning if the patient needs post-hospital services based on physician/LIP order or complex needs related to functional status, cognitive ability or social support system  Outcome: Progressing

## 2022-11-29 LAB
ADENOVIRUS F 40/41 PCR: NEGATIVE
ASTROVIRUS PCR: NEGATIVE
C CAYETANENSIS DNA SPEC QL NAA+PROBE: NEGATIVE
CAMPY SP DNA.DIARRHEA STL QL NAA+PROBE: NEGATIVE
CRYPTOSP DNA SPEC QL NAA+PROBE: NEGATIVE
EAEC PAA PLAS AGGR+AATA ST NAA+NON-PRB: NEGATIVE
EC STX1+STX2 + H7 FLIC SPEC NAA+PROBE: NEGATIVE
ENTAMOEBA HISTOLYTICA PCR: NEGATIVE
EPEC EAE GENE STL QL NAA+NON-PROBE: NEGATIVE
ETEC LTA+ST1A+ST1B TOX ST NAA+NON-PROBE: NEGATIVE
GIARDIA LAMBLIA PCR: NEGATIVE
NOROVIRUS GI/GII PCR: NEGATIVE
P SHIGELLOIDES DNA STL QL NAA+PROBE: NEGATIVE
ROTAVIRUS A PCR: NEGATIVE
SALMONELLA DNA SPEC QL NAA+PROBE: NEGATIVE
SAPOVIRUS PCR: NEGATIVE
SHIGELLA SP+EIEC IPAH ST NAA+NON-PROBE: NEGATIVE
V CHOLERAE DNA SPEC QL NAA+PROBE: NEGATIVE
VIBRIO DNA SPEC NAA+PROBE: NEGATIVE
YERSINIA DNA SPEC NAA+PROBE: NEGATIVE

## 2022-11-29 PROCEDURE — 99232 SBSQ HOSP IP/OBS MODERATE 35: CPT | Performed by: INTERNAL MEDICINE

## 2022-11-29 PROCEDURE — 0DTA0ZZ RESECTION OF JEJUNUM, OPEN APPROACH: ICD-10-PCS | Performed by: SURGERY

## 2022-11-29 NOTE — PLAN OF CARE
Patient ambulated in halls. Voids freely, lap sites intact. Hypo bowel sounds noted, denies flatus. Mild pain. SCD's are on. Patient declines lovenox. RN discussed what medication is for and the importance. Patient agreed to have SCD's on, leg exercises while in bed and frequent ambulation. POC discussed, all questions and concerns addressed. Will monitor.        Problem: Patient/Family Goals  Goal: Patient/Family Long Term Goal  Description: Patient's Long Term Goal: Discharge  Interventions:  - IVF  -Comfort  -vss  - See additional Care Plan goals for specific interventions  Outcome: Progressing  Goal: Patient/Family Short Term Goal  Description: Patient's Short Term Goal: Comfort    Interventions:   - Rounding  - See additional Care Plan goals for specific interventions  Outcome: Progressing     Problem: PAIN - ADULT  Goal: Verbalizes/displays adequate comfort level or patient's stated pain goal  Description: INTERVENTIONS:  - Encourage pt to monitor pain and request assistance  - Assess pain using appropriate pain scale  - Administer analgesics based on type and severity of pain and evaluate response  - Implement non-pharmacological measures as appropriate and evaluate response  - Consider cultural and social influences on pain and pain management  - Manage/alleviate anxiety  - Utilize distraction and/or relaxation techniques  - Monitor for opioid side effects  - Notify MD/LIP if interventions unsuccessful or patient reports new pain  - Anticipate increased pain with activity and pre-medicate as appropriate  Outcome: Progressing     Problem: DISCHARGE PLANNING  Goal: Discharge to home or other facility with appropriate resources  Description: INTERVENTIONS:  - Identify barriers to discharge w/pt and caregiver  - Include patient/family/discharge partner in discharge planning  - Arrange for needed discharge resources and transportation as appropriate  - Identify discharge learning needs (meds, wound care, etc)  - Arrange for interpreters to assist at discharge as needed  - Consider post-discharge preferences of patient/family/discharge partner  - Complete POLST form as appropriate  - Assess patient's ability to be responsible for managing their own health  - Refer to Case Management Department for coordinating discharge planning if the patient needs post-hospital services based on physician/LIP order or complex needs related to functional status, cognitive ability or social support system  Outcome: Progressing     Problem: GASTROINTESTINAL - ADULT  Goal: Minimal or absence of nausea and vomiting  Description: INTERVENTIONS:  - Maintain adequate hydration with IV or PO as ordered and tolerated  - Nasogastric tube to low intermittent suction as ordered  - Evaluate effectiveness of ordered antiemetic medications  - Provide nonpharmacologic comfort measures as appropriate  - Advance diet as tolerated, if ordered  - Obtain nutritional consult as needed  - Evaluate fluid balance  Outcome: Progressing  Goal: Maintains or returns to baseline bowel function  Description: INTERVENTIONS:  - Assess bowel function  - Maintain adequate hydration with IV or PO as ordered and tolerated  - Evaluate effectiveness of GI medications  - Encourage mobilization and activity  - Obtain nutritional consult as needed  - Establish a toileting routine/schedule  - Consider collaborating with pharmacy to review patient's medication profile  Outcome: Progressing  Goal: Maintains adequate nutritional intake (undernourished)  Description: INTERVENTIONS:  - Monitor percentage of each meal consumed  - Identify factors contributing to decreased intake, treat as appropriate  - Assist with meals as needed  - Monitor I&O, WT and lab values  - Obtain nutritional consult as needed  - Optimize oral hygiene and moisture  - Encourage food from home; allow for food preferences  - Enhance eating environment  Outcome: Progressing  Goal: Achieves appropriate nutritional intake (bariatric)  Description: INTERVENTIONS:  - Monitor for over-consumption  - Identify factors contributing to increased intake, treat as appropriate  - Monitor I&O, WT and lab values  - Obtain nutritional consult as needed  - Evaluate psychosocial factors contributing to over-consumption  Outcome: Progressing

## 2022-11-29 NOTE — PLAN OF CARE
Patient returned to unit via bed. A&O x4 VSS, tolerating RA. Patient denies chest pain, sob, n/v, or generalized pain. IVFs infusing. Lap x3 to abdomen clean/dry/intact. DTV. BS hypoactive, patient denies passing gas. NPO. Up ad tana. Patient updated on poc & instructed to use call light if in need; verbalized understanding. Call light within reach.      Problem: Patient/Family Goals  Goal: Patient/Family Long Term Goal  Description: Patient's Long Term Goal: Discharge  Interventions:  - IVF  -Comfort  -vss  - See additional Care Plan goals for specific interventions  Outcome: Progressing  Goal: Patient/Family Short Term Goal  Description: Patient's Short Term Goal: Comfort    Interventions:   - Rounding  - See additional Care Plan goals for specific interventions  Outcome: Progressing     Problem: PAIN - ADULT  Goal: Verbalizes/displays adequate comfort level or patient's stated pain goal  Description: INTERVENTIONS:  - Encourage pt to monitor pain and request assistance  - Assess pain using appropriate pain scale  - Administer analgesics based on type and severity of pain and evaluate response  - Implement non-pharmacological measures as appropriate and evaluate response  - Consider cultural and social influences on pain and pain management  - Manage/alleviate anxiety  - Utilize distraction and/or relaxation techniques  - Monitor for opioid side effects  - Notify MD/LIP if interventions unsuccessful or patient reports new pain  - Anticipate increased pain with activity and pre-medicate as appropriate  Outcome: Progressing     Problem: DISCHARGE PLANNING  Goal: Discharge to home or other facility with appropriate resources  Description: INTERVENTIONS:  - Identify barriers to discharge w/pt and caregiver  - Include patient/family/discharge partner in discharge planning  - Arrange for needed discharge resources and transportation as appropriate  - Identify discharge learning needs (meds, wound care, etc)  - Arrange for interpreters to assist at discharge as needed  - Consider post-discharge preferences of patient/family/discharge partner  - Complete POLST form as appropriate  - Assess patient's ability to be responsible for managing their own health  - Refer to Case Management Department for coordinating discharge planning if the patient needs post-hospital services based on physician/LIP order or complex needs related to functional status, cognitive ability or social support system  Outcome: Progressing

## 2022-11-29 NOTE — OPERATIVE REPORT
[facility]  Op Note    Renny Lopez Location: OR   [facility] [id number] MRN [id number]   Admission Date 11/26/2022 Operation Date 11/28/2022   Attending Physician Lakisha Holloway DO Operating Physician Danni Gorman MD   DATE OF OPERATION:  11/28/2022   PREOPERATIVE DIAGNOSIS: Jejunal intussusception  POSTOPERATIVE DIAGNOSIS: Jejunal intussusception  PROCEDURE PERFORMED: Diagnostic laparoscopy, laparoscopic assisted small bowel resection. SURGEON:  Danni Gorman MD  ASSISTANT: Elen Velazquez MD (His assistance was essential to the performance and conduct of this case, especially in identification of the intussusception and the anastomosis of the small bowel.)  ANESTHESIA: General.   SPECIMEN: Portion of small bowel. BLOOD LOSS:  20 cc   COMPLICATIONS: None. INDICATIONS FOR PROCEDURE: The patient is a 51-year-old gentleman who has had intermittent abdominal pain for 1 to 2 years. CT scan demonstrated intussusception. Follow-up CT enterography demonstrated persistent intussusception. He was unable to tolerate liquids. He was offered diagnostic laparoscopy, possible bowel resection. The risks, benefits, and alternatives were discussed in detail with the patient. Risks include but are not limited to bleeding, wound infection, recurrent intussusception and prolonged ileus. The patient was agreeable to proceed with the operation. FINDINGS: The patient had an area of dilated bowel with intussusception at each end. This portion of small bowel was resected. OPERATIVE TECHNIQUE: After informed consent was obtained, the patient was taken to the operating room and placed in the supine position. General anesthesia was induced. The abdomen was then prepped and draped in the usual sterile fashion. The umbilicus was inverted, and a curvilinear incision was made superior to it with an 11-blade scalpel.   The Veress needle was passed into the abdomen, and pneumoperitoneum was instituted without difficulty. The 5 mm trocar was passed through this incision site. The abdomen was inspected and found to be grossly normal. Under direct vision, bilateral 5 mm ports were placed. The transverse colon was reflected superiorly, and the ligament of Treitz was identified. The small bowel was then run distally. The point of intussusception was identified. Inspection of this area revealed that the small bowel had telescoped into the other portion of small bowel at each end. This portion of small bowel was then grasped with a grasper. A midline incision was made with a 10 blade scalpel. Cautery was used to obtain hemostasis and then to continue the dissection down through the tissue planes until the abdomen was entered. This portion of small bowel was delivered from the abdomen. Palpation of the small bowel revealed no obvious abnormalities. It was decided to proceed with resection since this had been a recurring problem for this patient. Using DIAN staplers, the small bowel was transected proximal and distal to the area of intussusception. The mesentery was taken down using 0 Vicryl ties. The staple line was entered using curved Bowers scissors. The DIAN staplers were fired down the enterotomies. The resultant enterotomy was closed with a TA 60 stapler. The mesenteric defect was reapproximated with 3-0 Vicryl suture. Succus was milked past the anastomosis, and no leaks were noted. The bowel was delivered back into the abdomen. Gown and gloves were changed. The fascia was reapproximated with #1 PDS suture. Subcutaneous tissues were irrigated. The skin was reapproximated with a 4-0 Monocryl suture. The abdomen was reinsufflated, and the anastomosis was inspected. No points of bleeding were noted. The 5 mm ports were withdrawn. The incisions at both the port sites were closed using 4-0 Monocryl suture. Steri-Strips were placed across the incisions as well as sterile dressings.   The patient tolerated the procedure well, was extubated in the OR, and went to the PACU in good condition.     Keesha Mendez MD

## 2022-11-29 NOTE — ANESTHESIA POSTPROCEDURE EVALUATION
[address] [facility] Patient Status:  Observation   Age/Gender 25year old male MRN [id number]   Location [address] [facility] Attending Shayy Hassan, [address] Day # 0 PCP None Pcp       Anesthesia Post-op Note    DIAGNOSTIC LAPAROSCOPY, EXPLORATORY LAPAROTOMY, SMALL BOWEL RESECTION    Procedure Summary     Date: 11/28/22 Room / Location: [facility] OR 08 / [facility] OR    Anesthesia Start: [place] Coulter Anesthesia Stop: 2110    Procedure: DIAGNOSTIC LAPAROSCOPY, EXPLORATORY LAPAROTOMY, SMALL BOWEL RESECTION (Abdomen) Diagnosis:       Intussusception ([address]      (Intussusception ([address] [K56.1])    Surgeons: Dory Trevino MD Anesthesiologist: Homero Mathis MD    Anesthesia Type: general ASA Status: 2          Anesthesia Type: general    Vitals Value Taken Time   /89 11/28/22 2109   Temp 97.5 11/28/22 2110   Pulse 73 11/28/22 2109   Resp 24 11/28/22 2109   SpO2 98 % 11/28/22 2109   Vitals shown include unvalidated device data. Patient Location: PACU    Anesthesia Type: general    Airway Patency: patent    Postop Pain Control: adequate    Mental Status: mildly sedated but able to meaningfully participate in the post-anesthesia evaluation    Nausea/Vomiting: none    Cardiopulmonary/Hydration status: stable euvolemic    Complications: no apparent anesthesia related complications    Postop vital signs: stable    Dental Exam: Unchanged from Preop    Patient to be discharged from PACU when criteria met.

## 2022-11-29 NOTE — ANESTHESIA PROCEDURE NOTES
Airway  Date/Time: 11/28/2022 7:26 PM  Urgency: elective      General Information and Staff    Patient location during procedure: OR  Anesthesiologist: Carmelita Scott MD  Performed: anesthesiologist     Indications and Patient Condition  Indications for airway management: anesthesia  Sedation level: deep  Preoxygenated: yes  Patient position: sniffing  Mask difficulty assessment: 0 - not attempted    Final Airway Details  Final airway type: endotracheal airway      Successful airway: ETT  Cuffed: yes   Successful intubation technique: direct laryngoscopy  Endotracheal tube insertion site: oral  Blade: Markus  Blade size: #3  ETT size (mm): 7.5    Cormack-Lehane Classification: grade I - full view of glottis  Placement verified by: chest auscultation and capnometry   Measured from: lips  ETT to lips (cm): 22  Number of attempts at approach: 1

## 2022-11-30 PROCEDURE — 99232 SBSQ HOSP IP/OBS MODERATE 35: CPT | Performed by: HOSPITALIST

## 2022-11-30 NOTE — CDS QUERY
Uncertain Diagnosis  Radha Minor  Dear Dr. Aiyana Michaud,   Clinical information (provided below) indicates a documented diagnosis of colitis. For accurate ICD-10-CM code assignment,   PLEASE clarify the diagnosis of colitis     [   ] Colitis ruled out   [   ] Colitis ruled in   [   ] Other (please specify):   [   ] Clinically Unable to Determine (please comment):       Documentation from the Medical Record:   Risk: Hx cannabis use with hyperemesis syndrome. Clinical indicators : Notes fever at home. Afebrile while inpatient. Abdominal pain nausea, vomiting , diarrhea. CT abdomen: The colon does appear thickened and somewhat edematous, lower attenuation than typical, findings could reflect colitis. H&P and hospitalist PN-CTAP with colitis and possible jejunal intussusception  CT enterography: There is wall thickening with low attenuation in the wall of the colon most    pronounced in the ascending and transverse colon although also involving the descending and sigmoid colon consistent with pan colitis. Inflammatory or infectious etiologies should be excluded. GI PN 11/28-repeat CTE shows focal intussusception involving jejunal loops. Proximal dilation. Wall thickening up to 9.1 mm - progressed. Colon wall thickening -if diarrhea, send stool studies; no BMs, +flatus. Clinically not typical of IBD or inflammatory, will monitor   GI stool panel negative  Treatment: GI consult. CTE, stool panel      For questions regarding this query, please contact Clinical :   Fadi Dickey RN, BSN, [facility] [address] [place] [address]. [email or web address]                                                           THIS FORM IS A PERMANENT PART OF THE MEDICAL RECORD

## 2022-11-30 NOTE — PLAN OF CARE
Pt vitals stable, A&O x4. Pt denied chest pain, stated having some shortness of breath while walking, encouraged the use of IS. Denied calf pain. Pt bowel sounds hypoactive, not passing gas or having BM yet. Laps x3 noted to abdomen. Tolerating ice chips. IVF infusing. Bed locked in low position, call light in reach, rounding provided.        Problem: Patient/Family Goals  Goal: Patient/Family Long Term Goal  Description: Patient's Long Term Goal: Discharge  Interventions:  - IVF  -Comfort  -vss  - See additional Care Plan goals for specific interventions  Outcome: Progressing  Goal: Patient/Family Short Term Goal  Description: Patient's Short Term Goal: Comfort    Interventions:   - Rounding  - See additional Care Plan goals for specific interventions  Outcome: Progressing     Problem: PAIN - ADULT  Goal: Verbalizes/displays adequate comfort level or patient's stated pain goal  Description: INTERVENTIONS:  - Encourage pt to monitor pain and request assistance  - Assess pain using appropriate pain scale  - Administer analgesics based on type and severity of pain and evaluate response  - Implement non-pharmacological measures as appropriate and evaluate response  - Consider cultural and social influences on pain and pain management  - Manage/alleviate anxiety  - Utilize distraction and/or relaxation techniques  - Monitor for opioid side effects  - Notify MD/LIP if interventions unsuccessful or patient reports new pain  - Anticipate increased pain with activity and pre-medicate as appropriate  Outcome: Progressing     Problem: DISCHARGE PLANNING  Goal: Discharge to home or other facility with appropriate resources  Description: INTERVENTIONS:  - Identify barriers to discharge w/pt and caregiver  - Include patient/family/discharge partner in discharge planning  - Arrange for needed discharge resources and transportation as appropriate  - Identify discharge learning needs (meds, wound care, etc)  - Arrange for interpreters to assist at discharge as needed  - Consider post-discharge preferences of patient/family/discharge partner  - Complete POLST form as appropriate  - Assess patient's ability to be responsible for managing their own health  - Refer to Case Management Department for coordinating discharge planning if the patient needs post-hospital services based on physician/LIP order or complex needs related to functional status, cognitive ability or social support system  Outcome: Progressing     Problem: GASTROINTESTINAL - ADULT  Goal: Minimal or absence of nausea and vomiting  Description: INTERVENTIONS:  - Maintain adequate hydration with IV or PO as ordered and tolerated  - Nasogastric tube to low intermittent suction as ordered  - Evaluate effectiveness of ordered antiemetic medications  - Provide nonpharmacologic comfort measures as appropriate  - Advance diet as tolerated, if ordered  - Obtain nutritional consult as needed  - Evaluate fluid balance  Outcome: Progressing  Goal: Maintains or returns to baseline bowel function  Description: INTERVENTIONS:  - Assess bowel function  - Maintain adequate hydration with IV or PO as ordered and tolerated  - Evaluate effectiveness of GI medications  - Encourage mobilization and activity  - Obtain nutritional consult as needed  - Establish a toileting routine/schedule  - Consider collaborating with pharmacy to review patient's medication profile  Outcome: Progressing  Goal: Maintains adequate nutritional intake (undernourished)  Description: INTERVENTIONS:  - Monitor percentage of each meal consumed  - Identify factors contributing to decreased intake, treat as appropriate  - Assist with meals as needed  - Monitor I&O, WT and lab values  - Obtain nutritional consult as needed  - Optimize oral hygiene and moisture  - Encourage food from home; allow for food preferences  - Enhance eating environment  Outcome: Progressing  Goal: Achieves appropriate nutritional intake (bariatric)  Description: INTERVENTIONS:  - Monitor for over-consumption  - Identify factors contributing to increased intake, treat as appropriate  - Monitor I&O, WT and lab values  - Obtain nutritional consult as needed  - Evaluate psychosocial factors contributing to over-consumption  Outcome: Progressing

## 2022-11-30 NOTE — PLAN OF CARE
Patient ambulating in halls. VSS. Voids freely. Stated passed flatus, will start clear liquids. Lap sites intact. Encouraged patient to continue using IS and ambulate in halls. POC discussed, will continue to monitor.

## 2022-11-30 NOTE — PLAN OF CARE
Problem: Patient/Family Goals  Goal: Patient/Family Long Term Goal  Description: Patient's Long Term Goal: Discharge  Interventions:  - IVF  -Comfort  -vss  - See additional Care Plan goals for specific interventions  Outcome: Progressing  Goal: Patient/Family Short Term Goal  Description: Patient's Short Term Goal: Comfort    Interventions:   - Rounding  - See additional Care Plan goals for specific interventions  Outcome: Progressing     Problem: PAIN - ADULT  Goal: Verbalizes/displays adequate comfort level or patient's stated pain goal  Description: INTERVENTIONS:  - Encourage pt to monitor pain and request assistance  - Assess pain using appropriate pain scale  - Administer analgesics based on type and severity of pain and evaluate response  - Implement non-pharmacological measures as appropriate and evaluate response  - Consider cultural and social influences on pain and pain management  - Manage/alleviate anxiety  - Utilize distraction and/or relaxation techniques  - Monitor for opioid side effects  - Notify MD/LIP if interventions unsuccessful or patient reports new pain  - Anticipate increased pain with activity and pre-medicate as appropriate  Outcome: Progressing     Problem: DISCHARGE PLANNING  Goal: Discharge to home or other facility with appropriate resources  Description: INTERVENTIONS:  - Identify barriers to discharge w/pt and caregiver  - Include patient/family/discharge partner in discharge planning  - Arrange for needed discharge resources and transportation as appropriate  - Identify discharge learning needs (meds, wound care, etc)  - Arrange for interpreters to assist at discharge as needed  - Consider post-discharge preferences of patient/family/discharge partner  - Complete POLST form as appropriate  - Assess patient's ability to be responsible for managing their own health  - Refer to Case Management Department for coordinating discharge planning if the patient needs post-hospital services based on physician/LIP order or complex needs related to functional status, cognitive ability or social support system  Outcome: Progressing     Problem: GASTROINTESTINAL - ADULT  Goal: Minimal or absence of nausea and vomiting  Description: INTERVENTIONS:  - Maintain adequate hydration with IV or PO as ordered and tolerated  - Nasogastric tube to low intermittent suction as ordered  - Evaluate effectiveness of ordered antiemetic medications  - Provide nonpharmacologic comfort measures as appropriate  - Advance diet as tolerated, if ordered  - Obtain nutritional consult as needed  - Evaluate fluid balance  Outcome: Progressing  Goal: Maintains or returns to baseline bowel function  Description: INTERVENTIONS:  - Assess bowel function  - Maintain adequate hydration with IV or PO as ordered and tolerated  - Evaluate effectiveness of GI medications  - Encourage mobilization and activity  - Obtain nutritional consult as needed  - Establish a toileting routine/schedule  - Consider collaborating with pharmacy to review patient's medication profile  Outcome: Progressing  Goal: Maintains adequate nutritional intake (undernourished)  Description: INTERVENTIONS:  - Monitor percentage of each meal consumed  - Identify factors contributing to decreased intake, treat as appropriate  - Assist with meals as needed  - Monitor I&O, WT and lab values  - Obtain nutritional consult as needed  - Optimize oral hygiene and moisture  - Encourage food from home; allow for food preferences  - Enhance eating environment  Outcome: Progressing  Goal: Achieves appropriate nutritional intake (bariatric)  Description: INTERVENTIONS:  - Monitor for over-consumption  - Identify factors contributing to increased intake, treat as appropriate  - Monitor I&O, WT and lab values  - Obtain nutritional consult as needed  - Evaluate psychosocial factors contributing to over-consumption  Outcome: Progressing

## 2022-12-01 VITALS
WEIGHT: 158.5 LBS | HEIGHT: 72 IN | HEART RATE: 58 BPM | RESPIRATION RATE: 16 BRPM | SYSTOLIC BLOOD PRESSURE: 126 MMHG | DIASTOLIC BLOOD PRESSURE: 71 MMHG | TEMPERATURE: 98 F | OXYGEN SATURATION: 97 % | BODY MASS INDEX: 21.47 KG/M2

## 2022-12-01 RX ORDER — HYDROCODONE BITARTRATE AND ACETAMINOPHEN 5; 325 MG/1; MG/1
1 TABLET ORAL EVERY 6 HOURS PRN
Qty: 8 TABLET | Refills: 0 | Status: SHIPPED | OUTPATIENT
Start: 2022-12-01

## 2022-12-01 NOTE — PLAN OF CARE
Assumed pt care at [facility]. A&O x4, VSS, afebrile. RA, . Pt tolerating clear liquids, denies n/v. Voids freely. Up ad tana. IV SL. Lap sites x3 with SS - C/D/I. Pt resting in bed with call light within reach and safety precautions in place.      Problem: Patient/Family Goals  Goal: Patient/Family Long Term Goal  Description: Patient's Long Term Goal: Discharge  Interventions:  - IVF  -Comfort  -vss  - See additional Care Plan goals for specific interventions  Outcome: Progressing  Goal: Patient/Family Short Term Goal  Description: Patient's Short Term Goal: Comfort    Interventions:   - Rounding  - See additional Care Plan goals for specific interventions  Outcome: Progressing     Problem: PAIN - ADULT  Goal: Verbalizes/displays adequate comfort level or patient's stated pain goal  Description: INTERVENTIONS:  - Encourage pt to monitor pain and request assistance  - Assess pain using appropriate pain scale  - Administer analgesics based on type and severity of pain and evaluate response  - Implement non-pharmacological measures as appropriate and evaluate response  - Consider cultural and social influences on pain and pain management  - Manage/alleviate anxiety  - Utilize distraction and/or relaxation techniques  - Monitor for opioid side effects  - Notify MD/LIP if interventions unsuccessful or patient reports new pain  - Anticipate increased pain with activity and pre-medicate as appropriate  Outcome: Progressing     Problem: DISCHARGE PLANNING  Goal: Discharge to home or other facility with appropriate resources  Description: INTERVENTIONS:  - Identify barriers to discharge w/pt and caregiver  - Include patient/family/discharge partner in discharge planning  - Arrange for needed discharge resources and transportation as appropriate  - Identify discharge learning needs (meds, wound care, etc)  - Arrange for interpreters to assist at discharge as needed  - Consider post-discharge preferences of patient/family/discharge partner  - Complete POLST form as appropriate  - Assess patient's ability to be responsible for managing their own health  - Refer to Case Management Department for coordinating discharge planning if the patient needs post-hospital services based on physician/LIP order or complex needs related to functional status, cognitive ability or social support system  Outcome: Progressing     Problem: GASTROINTESTINAL - ADULT  Goal: Minimal or absence of nausea and vomiting  Description: INTERVENTIONS:  - Maintain adequate hydration with IV or PO as ordered and tolerated  - Nasogastric tube to low intermittent suction as ordered  - Evaluate effectiveness of ordered antiemetic medications  - Provide nonpharmacologic comfort measures as appropriate  - Advance diet as tolerated, if ordered  - Obtain nutritional consult as needed  - Evaluate fluid balance  Outcome: Progressing  Goal: Maintains or returns to baseline bowel function  Description: INTERVENTIONS:  - Assess bowel function  - Maintain adequate hydration with IV or PO as ordered and tolerated  - Evaluate effectiveness of GI medications  - Encourage mobilization and activity  - Obtain nutritional consult as needed  - Establish a toileting routine/schedule  - Consider collaborating with pharmacy to review patient's medication profile  Outcome: Progressing  Goal: Maintains adequate nutritional intake (undernourished)  Description: INTERVENTIONS:  - Monitor percentage of each meal consumed  - Identify factors contributing to decreased intake, treat as appropriate  - Assist with meals as needed  - Monitor I&O, WT and lab values  - Obtain nutritional consult as needed  - Optimize oral hygiene and moisture  - Encourage food from home; allow for food preferences  - Enhance eating environment  Outcome: Progressing  Goal: Achieves appropriate nutritional intake (bariatric)  Description: INTERVENTIONS:  - Monitor for over-consumption  - Identify factors contributing to increased intake, treat as appropriate  - Monitor I&O, WT and lab values  - Obtain nutritional consult as needed  - Evaluate psychosocial factors contributing to over-consumption  Outcome: Progressing

## 2022-12-01 NOTE — PLAN OF CARE
Problem: Patient/Family Goals  Goal: Patient/Family Long Term Goal  Description: Patient's Long Term Goal: Discharge  Interventions:  -Comfort  -vs  -full liquids, adat  - See additional Care Plan goals for specific interventions  Outcome: Progressing  Goal: Patient/Family Short Term Goal  Description: Patient's Short Term Goal: Comfort    Interventions:   - Rounding  - See additional Care Plan goals for specific interventions  Outcome: Progressing     Problem: PAIN - ADULT  Goal: Verbalizes/displays adequate comfort level or patient's stated pain goal  Description: INTERVENTIONS:  - Encourage pt to monitor pain and request assistance  - Assess pain using appropriate pain scale  - Administer analgesics based on type and severity of pain and evaluate response  - Implement non-pharmacological measures as appropriate and evaluate response  - Consider cultural and social influences on pain and pain management  - Manage/alleviate anxiety  - Utilize distraction and/or relaxation techniques  - Monitor for opioid side effects  - Notify MD/LIP if interventions unsuccessful or patient reports new pain  - Anticipate increased pain with activity and pre-medicate as appropriate  Outcome: Progressing     Problem: DISCHARGE PLANNING  Goal: Discharge to home or other facility with appropriate resources  Description: INTERVENTIONS:  - Identify barriers to discharge w/pt and caregiver  - Include patient/family/discharge partner in discharge planning  - Arrange for needed discharge resources and transportation as appropriate  - Identify discharge learning needs (meds, wound care, etc)  - Arrange for interpreters to assist at discharge as needed  - Consider post-discharge preferences of patient/family/discharge partner  - Complete POLST form as appropriate  - Assess patient's ability to be responsible for managing their own health  - Refer to Case Management Department for coordinating discharge planning if the patient needs post-hospital services based on physician/LIP order or complex needs related to functional status, cognitive ability or social support system  Outcome: Progressing     Problem: GASTROINTESTINAL - ADULT  Goal: Minimal or absence of nausea and vomiting  Description: INTERVENTIONS:  - Maintain adequate hydration with IV or PO as ordered and tolerated  - Nasogastric tube to low intermittent suction as ordered  - Evaluate effectiveness of ordered antiemetic medications  - Provide nonpharmacologic comfort measures as appropriate  - Advance diet as tolerated, if ordered  - Obtain nutritional consult as needed  - Evaluate fluid balance  Outcome: Progressing  Goal: Maintains or returns to baseline bowel function  Description: INTERVENTIONS:  - Assess bowel function  - Maintain adequate hydration with IV or PO as ordered and tolerated  - Evaluate effectiveness of GI medications  - Encourage mobilization and activity  - Obtain nutritional consult as needed  - Establish a toileting routine/schedule  - Consider collaborating with pharmacy to review patient's medication profile  Outcome: Progressing  Goal: Maintains adequate nutritional intake (undernourished)  Description: INTERVENTIONS:  - Monitor percentage of each meal consumed  - Identify factors contributing to decreased intake, treat as appropriate  - Assist with meals as needed  - Monitor I&O, WT and lab values  - Obtain nutritional consult as needed  - Optimize oral hygiene and moisture  - Encourage food from home; allow for food preferences  - Enhance eating environment  Outcome: Progressing  Goal: Achieves appropriate nutritional intake (bariatric)  Description: INTERVENTIONS:  - Monitor for over-consumption  - Identify factors contributing to increased intake, treat as appropriate  - Monitor I&O, WT and lab values  - Obtain nutritional consult as needed  - Evaluate psychosocial factors contributing to over-consumption  Outcome: Progressing     Lap sites x3 with ss c/d/I, abd soft, non-distended, hypo BS, pt. Reports passing flatus. Diet advanced to full liquids.   No c/o pain or n/v.

## 2022-12-02 ENCOUNTER — PATIENT OUTREACH (OUTPATIENT)
Dept: CASE MANAGEMENT | Age: 22
End: 2022-12-02

## 2022-12-02 DIAGNOSIS — K56.1 INTUSSUSCEPTION INTESTINE (HCC): ICD-10-CM

## 2022-12-02 DIAGNOSIS — Z02.9 ENCOUNTERS FOR UNSPECIFIED ADMINISTRATIVE PURPOSE: ICD-10-CM

## 2022-12-02 NOTE — PAYOR COMM NOTE
--------------  DISCHARGE REVIEW    Payor: [facility]  Subscriber #:  [id number]  Authorization Number: [id number]    Admit date: 11/29/22  Admit time:  12:26 PM  Discharge Date: 12/1/2022 11:52 AM     Admitting Physician: Kendra Yuen DO  Attending Physician:  No att. providers found  Primary Care Physician: Pcp, None

## 2022-12-07 ENCOUNTER — OFFICE VISIT (OUTPATIENT)
Dept: INTERNAL MEDICINE CLINIC | Facility: CLINIC | Age: 22
End: 2022-12-07
Payer: COMMERCIAL

## 2022-12-07 VITALS
WEIGHT: 158 LBS | SYSTOLIC BLOOD PRESSURE: 122 MMHG | HEIGHT: 72 IN | OXYGEN SATURATION: 97 % | RESPIRATION RATE: 16 BRPM | BODY MASS INDEX: 21.4 KG/M2 | DIASTOLIC BLOOD PRESSURE: 86 MMHG | TEMPERATURE: 97 F | HEART RATE: 66 BPM

## 2022-12-07 DIAGNOSIS — Z90.49 S/P SMALL BOWEL RESECTION: ICD-10-CM

## 2022-12-07 DIAGNOSIS — F12.90 CANNABINOID HYPEREMESIS SYNDROME: ICD-10-CM

## 2022-12-07 DIAGNOSIS — R11.2 CANNABINOID HYPEREMESIS SYNDROME: ICD-10-CM

## 2022-12-07 DIAGNOSIS — K56.1 INTUSSUSCEPTION INTESTINE (HCC): Primary | ICD-10-CM

## 2022-12-07 PROCEDURE — 3074F SYST BP LT 130 MM HG: CPT | Performed by: NURSE PRACTITIONER

## 2022-12-07 PROCEDURE — 3008F BODY MASS INDEX DOCD: CPT | Performed by: NURSE PRACTITIONER

## 2022-12-07 PROCEDURE — 99495 TRANSJ CARE MGMT MOD F2F 14D: CPT | Performed by: NURSE PRACTITIONER

## 2022-12-07 PROCEDURE — 3079F DIAST BP 80-89 MM HG: CPT | Performed by: NURSE PRACTITIONER

## 2022-12-12 PROBLEM — Z90.49 S/P SMALL BOWEL RESECTION: Status: ACTIVE | Noted: 2022-12-12

## 2022-12-16 ENCOUNTER — OFFICE VISIT (OUTPATIENT)
Facility: LOCATION | Age: 22
End: 2022-12-16

## 2022-12-16 VITALS — TEMPERATURE: 98 F | WEIGHT: 162 LBS | BODY MASS INDEX: 22 KG/M2 | HEART RATE: 79 BPM

## 2022-12-16 DIAGNOSIS — Z98.890 POST-OPERATIVE STATE: Primary | ICD-10-CM

## 2022-12-16 PROCEDURE — 99024 POSTOP FOLLOW-UP VISIT: CPT | Performed by: PHYSICIAN ASSISTANT

## (undated) DEVICE — PROXIMATE RELOADABLE LINEAR CUTTER WITH SAFETY LOCK-OUT.  55MM LINEAR CUTTER.: Brand: PROXIMATE

## (undated) DEVICE — INSUFFLATION NEEDLE TO ESTABLISH PNEUMOPERITONEUM.: Brand: INSUFFLATION NEEDLE

## (undated) DEVICE — SUT MONOCRYL 4-0 PS-2 Y496G

## (undated) DEVICE — TROCAR: Brand: KII SHIELDED BLADED ACCESS SYSTEM

## (undated) DEVICE — TROCAR: Brand: KII® SLEEVE

## (undated) DEVICE — SUT PDS II 1 TP-1 D8020

## (undated) DEVICE — STERILE POLYISOPRENE POWDER-FREE SURGICAL GLOVES: Brand: PROTEXIS

## (undated) DEVICE — SUT VICRYL 0 J608H

## (undated) DEVICE — PROXIMATE RELOADABLE LINEAR STAPLER: Brand: PROXIMATE

## (undated) DEVICE — PROXIMATE LINEAR CUTTER RELOAD (STNADARD) , BLUE, 55MM: Brand: PROXIMATE

## (undated) DEVICE — VIOLET BRAIDED (POLYGLACTIN 910), SYNTHETIC ABSORBABLE SUTURE: Brand: COATED VICRYL

## (undated) DEVICE — E-Z BUTTON SWITCH PENCIL

## (undated) DEVICE — LIGHT HANDLE

## (undated) DEVICE — MONOFILAMENT ABSORBABLE SUTURE: Brand: MAXON

## (undated) DEVICE — PROXIMATE LINEAR STAPLER RELOADS: Brand: PROXIMATE

## (undated) DEVICE — USE ITEM #176901

## (undated) DEVICE — SLEEVE KENDALL SCD EXPRESS MED

## (undated) DEVICE — SOLUTION  .9 1000ML BTL

## (undated) DEVICE — GENERAL LAPAROS CDS-LF: Brand: MEDLINE INDUSTRIES, INC.

## (undated) DEVICE — MEDI-VAC NON-CONDUCTIVE SUCTION TUBING: Brand: CARDINAL HEALTH